# Patient Record
Sex: FEMALE | Race: WHITE | NOT HISPANIC OR LATINO | Employment: OTHER | URBAN - METROPOLITAN AREA
[De-identification: names, ages, dates, MRNs, and addresses within clinical notes are randomized per-mention and may not be internally consistent; named-entity substitution may affect disease eponyms.]

---

## 2020-02-10 DIAGNOSIS — M25.511 RIGHT SHOULDER PAIN, UNSPECIFIED CHRONICITY: ICD-10-CM

## 2020-02-10 DIAGNOSIS — M25.561 RIGHT KNEE PAIN, UNSPECIFIED CHRONICITY: Primary | ICD-10-CM

## 2020-03-18 ENCOUNTER — TELEPHONE (OUTPATIENT)
Dept: ORTHOPEDICS | Facility: CLINIC | Age: 70
End: 2020-03-18

## 2020-03-18 NOTE — TELEPHONE ENCOUNTER
Spoke with patient regarding appointment tomorrow with Dr. Jacobson. Patient will reschedule with Dr. Jacobson for 4/23.

## 2020-04-13 ENCOUNTER — TELEPHONE (OUTPATIENT)
Dept: ORTHOPEDICS | Facility: CLINIC | Age: 70
End: 2020-04-13

## 2020-04-13 NOTE — TELEPHONE ENCOUNTER
Called patient regarding upcoming appointment with Dr. Jacobson. Explained due to COVID-19, we are rescheduling all appointments unless they feel they are having an urgent or emergent issue. Patient verbalized understanding. Appointment rescheduled for 5/21.

## 2020-05-21 ENCOUNTER — OFFICE VISIT (OUTPATIENT)
Dept: ORTHOPEDICS | Facility: CLINIC | Age: 70
End: 2020-05-21
Payer: MEDICARE

## 2020-05-21 ENCOUNTER — HOSPITAL ENCOUNTER (OUTPATIENT)
Dept: RADIOLOGY | Facility: HOSPITAL | Age: 70
Discharge: HOME | End: 2020-05-21
Attending: ORTHOPAEDIC SURGERY
Payer: MEDICARE

## 2020-05-21 VITALS
SYSTOLIC BLOOD PRESSURE: 132 MMHG | TEMPERATURE: 97 F | DIASTOLIC BLOOD PRESSURE: 78 MMHG | HEIGHT: 62 IN | BODY MASS INDEX: 30.36 KG/M2 | HEART RATE: 76 BPM | WEIGHT: 165 LBS

## 2020-05-21 DIAGNOSIS — M25.572 PAIN IN LEFT ANKLE: ICD-10-CM

## 2020-05-21 DIAGNOSIS — G57.52 TARSAL TUNNEL SYNDROME OF LEFT SIDE: ICD-10-CM

## 2020-05-21 DIAGNOSIS — M75.22 BICEPS TENDONITIS ON LEFT: ICD-10-CM

## 2020-05-21 DIAGNOSIS — M25.511 RIGHT SHOULDER PAIN, UNSPECIFIED CHRONICITY: ICD-10-CM

## 2020-05-21 DIAGNOSIS — M75.42 IMPINGEMENT SYNDROME OF LEFT SHOULDER: Primary | ICD-10-CM

## 2020-05-21 DIAGNOSIS — M25.561 RIGHT KNEE PAIN, UNSPECIFIED CHRONICITY: ICD-10-CM

## 2020-05-21 PROCEDURE — 20550 NJX 1 TENDON SHEATH/LIGAMENT: CPT | Mod: 59,51,LT,S$GLB | Performed by: ORTHOPAEDIC SURGERY

## 2020-05-21 PROCEDURE — 1125F AMNT PAIN NOTED PAIN PRSNT: CPT | Mod: S$GLB,,, | Performed by: ORTHOPAEDIC SURGERY

## 2020-05-21 PROCEDURE — 99999 PR PBB SHADOW E&M-EST. PATIENT-LVL III: ICD-10-PCS | Mod: PBBFAC,,, | Performed by: ORTHOPAEDIC SURGERY

## 2020-05-21 PROCEDURE — 73560 XR KNEE ORTHO RIGHT: ICD-10-PCS | Mod: 26,LT,, | Performed by: RADIOLOGY

## 2020-05-21 PROCEDURE — 99214 OFFICE O/P EST MOD 30 MIN: CPT | Mod: 25,S$GLB,, | Performed by: ORTHOPAEDIC SURGERY

## 2020-05-21 PROCEDURE — 20610 LARGE JOINT ASPIRATION/INJECTION: L SUBACROMIAL BURSA: ICD-10-PCS | Mod: LT,S$GLB,, | Performed by: ORTHOPAEDIC SURGERY

## 2020-05-21 PROCEDURE — 20605 INTERMEDIATE JOINT ASPIRATION/INJECTION: L ANKLE: ICD-10-PCS | Mod: 51,LT,S$GLB, | Performed by: ORTHOPAEDIC SURGERY

## 2020-05-21 PROCEDURE — 99999 PR PBB SHADOW E&M-EST. PATIENT-LVL III: CPT | Mod: PBBFAC,,, | Performed by: ORTHOPAEDIC SURGERY

## 2020-05-21 PROCEDURE — 1159F MED LIST DOCD IN RCRD: CPT | Mod: S$GLB,,, | Performed by: ORTHOPAEDIC SURGERY

## 2020-05-21 PROCEDURE — 73562 XR KNEE ORTHO RIGHT: ICD-10-PCS | Mod: 26,RT,, | Performed by: RADIOLOGY

## 2020-05-21 PROCEDURE — 73610 XR ANKLE COMPLETE 3 VIEW LEFT: ICD-10-PCS | Mod: 26,LT,, | Performed by: RADIOLOGY

## 2020-05-21 PROCEDURE — 73610 X-RAY EXAM OF ANKLE: CPT | Mod: 26,LT,, | Performed by: RADIOLOGY

## 2020-05-21 PROCEDURE — 73562 X-RAY EXAM OF KNEE 3: CPT | Mod: 26,RT,, | Performed by: RADIOLOGY

## 2020-05-21 PROCEDURE — 73610 X-RAY EXAM OF ANKLE: CPT | Mod: TC,PO,LT

## 2020-05-21 PROCEDURE — 20610 DRAIN/INJ JOINT/BURSA W/O US: CPT | Mod: LT,S$GLB,, | Performed by: ORTHOPAEDIC SURGERY

## 2020-05-21 PROCEDURE — 1125F PR PAIN SEVERITY QUANTIFIED, PAIN PRESENT: ICD-10-PCS | Mod: S$GLB,,, | Performed by: ORTHOPAEDIC SURGERY

## 2020-05-21 PROCEDURE — 20550 TENDON SHEATH: ICD-10-PCS | Mod: 59,51,LT,S$GLB | Performed by: ORTHOPAEDIC SURGERY

## 2020-05-21 PROCEDURE — 1101F PT FALLS ASSESS-DOCD LE1/YR: CPT | Mod: CPTII,S$GLB,, | Performed by: ORTHOPAEDIC SURGERY

## 2020-05-21 PROCEDURE — 73030 X-RAY EXAM OF SHOULDER: CPT | Mod: 26,LT,, | Performed by: RADIOLOGY

## 2020-05-21 PROCEDURE — 73560 X-RAY EXAM OF KNEE 1 OR 2: CPT | Mod: 26,LT,, | Performed by: RADIOLOGY

## 2020-05-21 PROCEDURE — 20605 DRAIN/INJ JOINT/BURSA W/O US: CPT | Mod: 51,LT,S$GLB, | Performed by: ORTHOPAEDIC SURGERY

## 2020-05-21 PROCEDURE — 73560 X-RAY EXAM OF KNEE 1 OR 2: CPT | Mod: TC,PO,LT

## 2020-05-21 PROCEDURE — 1101F PR PT FALLS ASSESS DOC 0-1 FALLS W/OUT INJ PAST YR: ICD-10-PCS | Mod: CPTII,S$GLB,, | Performed by: ORTHOPAEDIC SURGERY

## 2020-05-21 PROCEDURE — 99214 PR OFFICE/OUTPT VISIT, EST, LEVL IV, 30-39 MIN: ICD-10-PCS | Mod: 25,S$GLB,, | Performed by: ORTHOPAEDIC SURGERY

## 2020-05-21 PROCEDURE — 73030 XR SHOULDER TRAUMA 3 VIEW LEFT: ICD-10-PCS | Mod: 26,LT,, | Performed by: RADIOLOGY

## 2020-05-21 PROCEDURE — 1159F PR MEDICATION LIST DOCUMENTED IN MEDICAL RECORD: ICD-10-PCS | Mod: S$GLB,,, | Performed by: ORTHOPAEDIC SURGERY

## 2020-05-21 PROCEDURE — 73030 X-RAY EXAM OF SHOULDER: CPT | Mod: TC,PO,LT

## 2020-05-21 RX ORDER — ALENDRONATE SODIUM 70 MG/1
TABLET ORAL
COMMUNITY
Start: 2020-05-15

## 2020-05-21 RX ORDER — MONTELUKAST SODIUM 10 MG/1
10 TABLET ORAL
COMMUNITY
Start: 2020-05-06

## 2020-05-21 RX ORDER — CITALOPRAM 20 MG/1
TABLET, FILM COATED ORAL
COMMUNITY
Start: 2018-06-05

## 2020-05-21 RX ORDER — CYCLOBENZAPRINE HCL 10 MG
10 TABLET ORAL
COMMUNITY
Start: 2018-06-05

## 2020-05-21 RX ORDER — GABAPENTIN 300 MG/1
300 CAPSULE ORAL
COMMUNITY
Start: 2018-06-05

## 2020-05-21 RX ORDER — PANTOPRAZOLE SODIUM 40 MG/1
40 TABLET, DELAYED RELEASE ORAL 2 TIMES DAILY
COMMUNITY
Start: 2018-06-05

## 2020-05-21 RX ORDER — HYDROCODONE BITARTRATE AND ACETAMINOPHEN 10; 325 MG/1; MG/1
1 TABLET ORAL
COMMUNITY
Start: 2018-06-05

## 2020-05-21 RX ORDER — PRAMIPEXOLE DIHYDROCHLORIDE 0.25 MG/1
TABLET ORAL
COMMUNITY
Start: 2018-06-05

## 2020-05-21 RX ORDER — AMLODIPINE AND BENAZEPRIL HYDROCHLORIDE 5; 10 MG/1; MG/1
CAPSULE ORAL
COMMUNITY
Start: 2018-06-05

## 2020-05-21 RX ORDER — PRAVASTATIN SODIUM 40 MG/1
TABLET ORAL
COMMUNITY
Start: 2018-06-05

## 2020-05-21 RX ORDER — ALBUTEROL SULFATE 90 UG/1
2 AEROSOL, METERED RESPIRATORY (INHALATION)
COMMUNITY
Start: 2020-05-06

## 2020-05-21 RX ORDER — ALBUTEROL SULFATE 1.25 MG/3ML
1.25 SOLUTION RESPIRATORY (INHALATION)
COMMUNITY
Start: 2020-05-06

## 2020-05-21 RX ADMIN — TRIAMCINOLONE ACETONIDE 40 MG: 40 INJECTION, SUSPENSION INTRA-ARTICULAR; INTRAMUSCULAR at 01:05

## 2020-05-21 NOTE — PROCEDURES
Intermediate Joint Aspiration/Injection: L ankle  Date/Time: 5/21/2020 1:30 PM  Performed by: Adarsh Jacobson MD  Authorized by: Adarsh Jacobson MD     Consent Done?:  Yes (Verbal)  Indications:  Pain  Site marked: The procedure site was marked    Timeout: Prior to procedure the correct patient, procedure, and site was verified      Location:  Ankle  Site:  L ankle  Prep: Patient was prepped and draped in usual sterile fashion    Medications:  40 mg triamcinolone acetonide 40 mg/mL (20 mg injected )

## 2020-05-21 NOTE — PROCEDURES
Large Joint Aspiration/Injection: L subacromial bursa  Date/Time: 5/21/2020 1:30 PM  Performed by: Adarsh Jacobson MD  Authorized by: Adarsh Jacobson MD     Consent Done?:  Yes (Verbal)  Indications:  Pain  Timeout: prior to procedure the correct patient, procedure, and site was verified    Prep: patient was prepped and draped in usual sterile fashion      Local anesthesia used?: Yes    Local anesthetic:  Lidocaine 1% without epinephrine  Anesthetic total (ml):  5      Details:  Needle Size:  21 G  Ultrasonic Guidance for needle placement?: No    Approach:  Posterior  Location:  Shoulder  Site:  L subacromial bursa  Medications:  40 mg triamcinolone acetonide 40 mg/mL  Patient tolerance:  Patient tolerated the procedure well with no immediate complications

## 2020-05-21 NOTE — PROCEDURES
Tendon Sheath  Date/Time: 5/21/2020 1:30 PM  Performed by: Adarsh Jacobson MD  Authorized by: Adarsh Jacobson MD     Consent Done?:  Yes (Verbal)  Indications:  Pain  Timeout: prior to procedure the correct patient, procedure, and site was verified    Local anesthesia used?: No    Anesthetic total (ml):  1    Location:  Shoulder  Site:  L bicep tendon  Ultrasonic guidance for needle placement?: No    Needle size:  25 G  Medications:  40 mg triamcinolone acetonide 40 mg/mL  Patient tolerance:  Patient tolerated the procedure well with no immediate complications

## 2020-05-21 NOTE — PROGRESS NOTES
Chief Complaint   Patient presents with    Right Knee - Pain    Left Shoulder - Pain    Left Ankle - Pain       HPI:    This is a 69 y.o. who presents today complaining of left shoulder, left ankle and right knee pain for the past few months after no known trauma. Pain is dull. She also complains of right knee stiffness after she stopped PT.  No numbness or tingling. No associated signs or symptoms.      History reviewed. No pertinent past medical history.   History reviewed. No pertinent surgical history.   Current Outpatient Medications on File Prior to Visit   Medication Sig Dispense Refill    albuterol (ACCUNEB) 1.25 mg/3 mL Nebu Inhale 1.25 mg into the lungs.      albuterol (PROVENTIL/VENTOLIN HFA) 90 mcg/actuation inhaler Inhale 2 puffs into the lungs.      alendronate (FOSAMAX) 70 MG tablet TAKE 1 TABLET BY MOUTH ONCE WEEKLY ON SAME DAY IN THE MORNING WITH GLASS OF WATER, REMAIN UPRIGHT FOR 30 MIN.      amlodipine-benazepril 5-10 mg (LOTREL) 5-10 mg per capsule TAKE 1 CAPSULE EVERY DAY      citalopram (CELEXA) 20 MG tablet TAKE 1 TABLET EVERY DAY      cyclobenzaprine (FLEXERIL) 10 MG tablet Take 10 mg by mouth.      fluticasone-umeclidin-vilanter (TRELEGY ELLIPTA) 100-62.5-25 mcg DsDv Inhale 1 puff into the lungs.      gabapentin (NEURONTIN) 300 MG capsule Take 300 mg by mouth.      HYDROcodone-acetaminophen (NORCO)  mg per tablet Take 1 tablet by mouth.      montelukast (SINGULAIR) 10 mg tablet Take 10 mg by mouth.      pantoprazole (PROTONIX) 40 MG tablet TAKE 1 TABLET EVERY DAY      pramipexole (MIRAPEX) 0.25 MG tablet TAKE 1 TABLET EVERY DAY      pravastatin (PRAVACHOL) 40 MG tablet TAKE 1 TABLET EVERY DAY       No current facility-administered medications on file prior to visit.       Review of patient's allergies indicates:   Allergen Reactions    Codeine Nausea And Vomiting     Taken norco,       Morphine Other (See Comments)     headache      Sulfa (sulfonamide antibiotics)  Other (See Comments)     Unknown to pt      Family History not pertinent   Social History     Socioeconomic History    Marital status:      Spouse name: Not on file    Number of children: Not on file    Years of education: Not on file    Highest education level: Not on file   Occupational History    Not on file   Social Needs    Financial resource strain: Not on file    Food insecurity:     Worry: Not on file     Inability: Not on file    Transportation needs:     Medical: Not on file     Non-medical: Not on file   Tobacco Use    Smoking status: Former Smoker     Last attempt to quit: 2004     Years since quittin.3    Smokeless tobacco: Never Used   Substance and Sexual Activity    Alcohol use: Not on file    Drug use: Not on file    Sexual activity: Not on file   Lifestyle    Physical activity:     Days per week: Not on file     Minutes per session: Not on file    Stress: Not on file   Relationships    Social connections:     Talks on phone: Not on file     Gets together: Not on file     Attends Oriental orthodox service: Not on file     Active member of club or organization: Not on file     Attends meetings of clubs or organizations: Not on file     Relationship status: Not on file   Other Topics Concern    Not on file   Social History Narrative    Not on file         Review of Systems:   Constitutional:  Denies fever or chills    Eyes:  Denies change in visual acuity    HENT:  Denies nasal congestion or sore throat    Respiratory:  Denies cough or shortness of breath    Cardiovascular:  Denies chest pain or edema    GI:  Denies abdominal pain, nausea, vomiting, bloody stools or diarrhea    :  Denies dysuria    Integument:  Denies rash    Neurologic:  Denies headache, focal weakness or sensory changes    Endocrine:  Denies polyuria or polydipsia    Lymphatic:  Denies swollen glands    Psychiatric:  Denies depression or anxiety       Physical Exam:    Constitutional:  Well developed,  well nourished, no acute distress, non-toxic appearance    Integument:  Well hydrated, no rash    Lymphatic:  No lymphadenopathy noted    Neurologic:  Alert & oriented x 3,     Psychiatric:  Speech and behavior appropriate    Gi: abdomen soft  Eyes: EOMI      Bilateral Shoulder Exam    right Shoulder Exam   Shoulder exam performed same as contralateral side and is normal.    left Shoulder Exam   Tenderness   Shoulder tenderness location: diffusely about shoulder.    Range of Motion   Forward Flexion: abnormal   External Rotation: abnormal     Muscle Strength   Supraspinatus: 4/5     Tests   Hawkin's test: positive  Impingement: positive    Other   Erythema: absent  Sensation: normal  Pulse: present      Bilateral knee exam     Left knee  Exam performed same as contralateral side and is normal      right knee: ROM 0-45. Overall normal alignment. Incision healed. No erythema or fluctuance. Stable to stress. Skin intact. Compartments soft. NVI distally.     Bilateral ankle exam   right ankle: Exam performed same as contralateral side and is normal    Left ankle:   Skin intact. No erythema or fluctuance.Overall normal alignment. Mild point TTP about the posterior tibial tendon. Pain with provacative testing. Compartments soft. Skin intact. NVI distally.    X-rays were performed today, personally reviewed by me and findings discussed with the patient.   3 views of the left ankle show no fractures or dislocations      Impingement syndrome of left shoulder  -     Large Joint Aspiration/Injection: L subacromial bursa    Biceps tendonitis on left  -     Tendon Sheath    Tarsal tunnel syndrome of left side  -     Intermediate Joint Aspiration/Injection: L ankle          Using an aseptic technique, I injected 5 cc of lidocaine 1% without and 1 cc of kenalog 40 mg and I injected 1 cc of lidocaine 1% without and 1 cc of kenalog 40mg into the left biceps tendon an I injected 0.5 cc of lidocaine 1% without and 0.5 cc of kenalog 40  mg into the left tarsal tunnel. The patient tolerated this well. I will have them return to clinic in 2 months.

## 2020-06-03 RX ORDER — TRIAMCINOLONE ACETONIDE 40 MG/ML
40 INJECTION, SUSPENSION INTRA-ARTICULAR; INTRAMUSCULAR
Status: DISCONTINUED | OUTPATIENT
Start: 2020-05-21 | End: 2020-06-03 | Stop reason: HOSPADM

## 2020-07-21 ENCOUNTER — OFFICE VISIT (OUTPATIENT)
Dept: ORTHOPEDICS | Facility: CLINIC | Age: 70
End: 2020-07-21
Payer: MEDICARE

## 2020-07-21 VITALS
HEIGHT: 62 IN | WEIGHT: 165 LBS | SYSTOLIC BLOOD PRESSURE: 143 MMHG | HEART RATE: 76 BPM | TEMPERATURE: 98 F | BODY MASS INDEX: 30.36 KG/M2 | DIASTOLIC BLOOD PRESSURE: 84 MMHG

## 2020-07-21 DIAGNOSIS — M54.12 CERVICAL RADICULOPATHY AT C6: Primary | ICD-10-CM

## 2020-07-21 PROCEDURE — 99999 PR PBB SHADOW E&M-EST. PATIENT-LVL IV: CPT | Mod: PBBFAC,,, | Performed by: ORTHOPAEDIC SURGERY

## 2020-07-21 PROCEDURE — 99999 PR PBB SHADOW E&M-EST. PATIENT-LVL IV: ICD-10-PCS | Mod: PBBFAC,,, | Performed by: ORTHOPAEDIC SURGERY

## 2020-07-21 PROCEDURE — 3008F BODY MASS INDEX DOCD: CPT | Mod: CPTII,S$GLB,, | Performed by: ORTHOPAEDIC SURGERY

## 2020-07-21 PROCEDURE — 1101F PT FALLS ASSESS-DOCD LE1/YR: CPT | Mod: CPTII,S$GLB,, | Performed by: ORTHOPAEDIC SURGERY

## 2020-07-21 PROCEDURE — 1159F PR MEDICATION LIST DOCUMENTED IN MEDICAL RECORD: ICD-10-PCS | Mod: S$GLB,,, | Performed by: ORTHOPAEDIC SURGERY

## 2020-07-21 PROCEDURE — 99214 PR OFFICE/OUTPT VISIT, EST, LEVL IV, 30-39 MIN: ICD-10-PCS | Mod: S$GLB,,, | Performed by: ORTHOPAEDIC SURGERY

## 2020-07-21 PROCEDURE — 1125F PR PAIN SEVERITY QUANTIFIED, PAIN PRESENT: ICD-10-PCS | Mod: S$GLB,,, | Performed by: ORTHOPAEDIC SURGERY

## 2020-07-21 PROCEDURE — 1159F MED LIST DOCD IN RCRD: CPT | Mod: S$GLB,,, | Performed by: ORTHOPAEDIC SURGERY

## 2020-07-21 PROCEDURE — 3008F PR BODY MASS INDEX (BMI) DOCUMENTED: ICD-10-PCS | Mod: CPTII,S$GLB,, | Performed by: ORTHOPAEDIC SURGERY

## 2020-07-21 PROCEDURE — 99214 OFFICE O/P EST MOD 30 MIN: CPT | Mod: S$GLB,,, | Performed by: ORTHOPAEDIC SURGERY

## 2020-07-21 PROCEDURE — 1125F AMNT PAIN NOTED PAIN PRSNT: CPT | Mod: S$GLB,,, | Performed by: ORTHOPAEDIC SURGERY

## 2020-07-21 PROCEDURE — 1101F PR PT FALLS ASSESS DOC 0-1 FALLS W/OUT INJ PAST YR: ICD-10-PCS | Mod: CPTII,S$GLB,, | Performed by: ORTHOPAEDIC SURGERY

## 2020-07-21 RX ORDER — FAMOTIDINE 20 MG/1
20 TABLET, FILM COATED ORAL 2 TIMES DAILY
COMMUNITY
Start: 2020-07-09

## 2020-07-21 NOTE — PROGRESS NOTES
Chief Complaint   Patient presents with    Left Shoulder - Pain       HPI:   This is a 69 y.o. who returns today in follow up for  left shoulder pain for the past few months after no known trauma. Patient has been WBAT.  Pain is throbbing. Complains of tingling down arm. No associated signs or symptoms. Reports mild relief from previous injection.     History reviewed. No pertinent past medical history.  History reviewed. No pertinent surgical history.  Current Outpatient Medications on File Prior to Visit   Medication Sig Dispense Refill    albuterol (ACCUNEB) 1.25 mg/3 mL Nebu Inhale 1.25 mg into the lungs.      albuterol (PROVENTIL/VENTOLIN HFA) 90 mcg/actuation inhaler Inhale 2 puffs into the lungs.      alendronate (FOSAMAX) 70 MG tablet TAKE 1 TABLET BY MOUTH ONCE WEEKLY ON SAME DAY IN THE MORNING WITH GLASS OF WATER, REMAIN UPRIGHT FOR 30 MIN.      amlodipine-benazepril 5-10 mg (LOTREL) 5-10 mg per capsule TAKE 1 CAPSULE EVERY DAY      citalopram (CELEXA) 20 MG tablet TAKE 1 TABLET EVERY DAY      cyclobenzaprine (FLEXERIL) 10 MG tablet Take 10 mg by mouth.      famotidine (PEPCID) 20 MG tablet Take 20 mg by mouth 2 (two) times a day.      fluticasone-umeclidin-vilanter (TRELEGY ELLIPTA) 100-62.5-25 mcg DsDv Inhale 1 puff into the lungs.      gabapentin (NEURONTIN) 300 MG capsule Take 300 mg by mouth.      HYDROcodone-acetaminophen (NORCO)  mg per tablet Take 1 tablet by mouth.      montelukast (SINGULAIR) 10 mg tablet Take 10 mg by mouth.      pantoprazole (PROTONIX) 40 MG tablet Take 40 mg by mouth 2 (two) times daily.       pramipexole (MIRAPEX) 0.25 MG tablet TAKE 1 TABLET EVERY DAY      pravastatin (PRAVACHOL) 40 MG tablet TAKE 1 TABLET EVERY DAY       No current facility-administered medications on file prior to visit.      Review of patient's allergies indicates:   Allergen Reactions    Codeine Nausea And Vomiting     Taken norco,       Morphine Other (See Comments)      headache      Sulfa (sulfonamide antibiotics) Other (See Comments)     Unknown to pt     History reviewed. No pertinent family history.  Social History     Socioeconomic History    Marital status:      Spouse name: Not on file    Number of children: Not on file    Years of education: Not on file    Highest education level: Not on file   Occupational History    Not on file   Social Needs    Financial resource strain: Not on file    Food insecurity     Worry: Not on file     Inability: Not on file    Transportation needs     Medical: Not on file     Non-medical: Not on file   Tobacco Use    Smoking status: Former Smoker     Quit date: 2004     Years since quittin.5    Smokeless tobacco: Never Used   Substance and Sexual Activity    Alcohol use: Not on file    Drug use: Not on file    Sexual activity: Not on file   Lifestyle    Physical activity     Days per week: Not on file     Minutes per session: Not on file    Stress: Not on file   Relationships    Social connections     Talks on phone: Not on file     Gets together: Not on file     Attends Mosque service: Not on file     Active member of club or organization: Not on file     Attends meetings of clubs or organizations: Not on file     Relationship status: Not on file   Other Topics Concern    Not on file   Social History Narrative    Not on file       Review of Systems:  Constitutional:  Denies fever or chills   Eyes:  Denies change in visual acuity   HENT:  Denies nasal congestion or sore throat   Respiratory:  Denies cough or shortness of breath   Cardiovascular:  Denies chest pain or edema   GI:  Denies abdominal pain, nausea, vomiting, bloody stools or diarrhea   :  Denies dysuria   Integument:  Denies rash   Neurologic:  Denies headache, focal weakness or sensory changes   Endocrine:  Denies polyuria or polydipsia   Lymphatic:  Denies swollen glands   Psychiatric:  Denies depression or anxiety     Physical Exam:    Constitutional:  Well developed, well nourished, no acute distress, non-toxic appearance   Integument:  Well hydrated, no rash   Lymphatic:  No lymphadenopathy noted   Neurologic:  Alert & oriented x   Psychiatric:  Speech and behavior appropriate   Gi: abdomen soft  Eyes: EOMI    Bilateral Shoulder Exam     right Shoulder Exam   Shoulder exam performed same as contralateral side and is normal.     left Shoulder Exam   Tenderness   Shoulder tenderness location: diffusely about shoulder.     Range of Motion   Forward Flexion: normal   External Rotation: normal      Muscle Strength   Supraspinatus: 4/5      Tests   Hawkin's test: positive  Impingement: positive     Other   Erythema: absent  Sensation: normal  Pulse: present         Cervical radiculopathy at C6        Treatment options discussed. Will follow with pain management. RTC as needed.

## 2022-08-04 ENCOUNTER — TELEPHONE (OUTPATIENT)
Dept: ORTHOPEDICS | Facility: CLINIC | Age: 72
End: 2022-08-04
Payer: MEDICARE

## 2022-08-04 NOTE — TELEPHONE ENCOUNTER
Spoke with Soco at [facility]. All questions answered. Records must be retrieved from [facility]. Understanding verbalized.

## 2022-08-04 NOTE — TELEPHONE ENCOUNTER
----- Message from Bernie Bullock sent at 8/4/2022  8:54 AM CDT -----  Contact: Soco  Type: Patient Call Back         Who called: Soco Carson         What is the request in detail: calling to speak with staff regarding on going requesting of records for patient visit regarding her right knee back in 2/2017; states each staff keeps sending records for the left knee; state she needs records for the right knee; please advise         Best call back number: [phone]         Additional Information: fax [phone]            Thank You

## 2023-10-11 DIAGNOSIS — M75.42 IMPINGEMENT SYNDROME OF LEFT SHOULDER: Primary | ICD-10-CM
